# Patient Record
Sex: FEMALE | Race: WHITE | NOT HISPANIC OR LATINO | URBAN - METROPOLITAN AREA
[De-identification: names, ages, dates, MRNs, and addresses within clinical notes are randomized per-mention and may not be internally consistent; named-entity substitution may affect disease eponyms.]

---

## 2018-02-15 ENCOUNTER — OFFICE VISIT (OUTPATIENT)
Dept: OBGYN CLINIC | Facility: CLINIC | Age: 23
End: 2018-02-15
Payer: COMMERCIAL

## 2018-02-15 VITALS
HEIGHT: 61 IN | DIASTOLIC BLOOD PRESSURE: 70 MMHG | SYSTOLIC BLOOD PRESSURE: 110 MMHG | BODY MASS INDEX: 23.03 KG/M2 | WEIGHT: 122 LBS

## 2018-02-15 DIAGNOSIS — Z30.41 ENCOUNTER FOR SURVEILLANCE OF CONTRACEPTIVE PILLS: Primary | ICD-10-CM

## 2018-02-15 PROCEDURE — S0612 ANNUAL GYNECOLOGICAL EXAMINA: HCPCS | Performed by: OBSTETRICS & GYNECOLOGY

## 2018-02-15 RX ORDER — ALBUTEROL SULFATE 90 UG/1
2 AEROSOL, METERED RESPIRATORY (INHALATION) EVERY 6 HOURS
COMMUNITY
Start: 2016-04-20

## 2018-02-15 RX ORDER — NORGESTIMATE AND ETHINYL ESTRADIOL 7DAYSX3 28
1 KIT ORAL DAILY
COMMUNITY
Start: 2016-12-20 | End: 2018-02-15 | Stop reason: SDUPTHER

## 2018-02-15 RX ORDER — NORGESTIMATE AND ETHINYL ESTRADIOL 7DAYSX3 28
1 KIT ORAL DAILY
Qty: 28 TABLET | Refills: 11 | Status: SHIPPED | OUTPATIENT
Start: 2018-02-15 | End: 2019-02-01 | Stop reason: SDUPTHER

## 2018-02-15 NOTE — PROGRESS NOTES
Assessment/Plan:      Pap smear deferred due to low risk status  Encouraged self-breast examination as well as calcium supplementation  Continue Ortho-Tri-Cyclen x1 year  She will return to office in 1 year or p r n  No problem-specific Assessment & Plan notes found for this encounter  Diagnoses and all orders for this visit:    Encounter for surveillance of contraceptive pills  -     norgestimate-ethinyl estradiol (ORTHO TRI-CYCLEN,TRINESSA) 0 18/0 215/0 25 MG-35 MCG per tablet; Take 1 tablet by mouth daily    Other orders  -     albuterol (PROVENTIL HFA,VENTOLIN HFA) 90 mcg/act inhaler; Inhale 2 puffs every 6 (six) hours  -     Discontinue: norgestimate-ethinyl estradiol (ORTHO TRI-CYCLEN,TRINESSA) 0 18/0 215/0 25 MG-35 MCG per tablet; Take 1 tablet by mouth daily          Subjective:      Patient ID: Sarah Murrieta is a 23 y o  female  HPI    This is a 70-year-old female G0 who presents for her annual gyn exam   She offers no complaints today  Her menstrual cycles are regular every 4 weeks lasting 4 days with no breakthrough bleeding  She denies any changes in bowel or bladder function  She is sexually active and has been in a monogamous relationship for 6 years  She did receive the Gardasil vaccine in 2009  All her Pap smears have been normal  Her last Pap smear was 12/2016  The following portions of the patient's history were reviewed and updated as appropriate: allergies, current medications, past family history, past medical history, past social history, past surgical history and problem list     Review of Systems   Constitutional: Negative for fatigue, fever and unexpected weight change  Respiratory: Negative for cough, chest tightness, shortness of breath and wheezing  Cardiovascular: Negative  Negative for chest pain and palpitations  Gastrointestinal: Negative    Negative for abdominal distention, abdominal pain, blood in stool, constipation, diarrhea, nausea and vomiting  Genitourinary: Negative  Negative for difficulty urinating, dyspareunia, dysuria, flank pain, frequency, genital sores, hematuria, pelvic pain, urgency, vaginal bleeding, vaginal discharge and vaginal pain  Skin: Negative for rash  Objective:    Vitals:    02/15/18 1400   BP: 110/70        Physical Exam   Constitutional: She appears well-developed and well-nourished  Cardiovascular: Normal rate and regular rhythm  Pulmonary/Chest: Effort normal and breath sounds normal  Right breast exhibits no inverted nipple, no mass, no nipple discharge, no skin change and no tenderness  Left breast exhibits no inverted nipple, no mass, no nipple discharge, no skin change and no tenderness  Abdominal: Soft  Bowel sounds are normal  She exhibits no distension  There is no tenderness  There is no guarding  Genitourinary: Vagina normal and uterus normal  Cervix exhibits no motion tenderness, no discharge and no friability  Right adnexum displays no mass, no tenderness and no fullness  Left adnexum displays no mass, no tenderness and no fullness  No vaginal discharge found

## 2019-02-01 DIAGNOSIS — Z30.41 ENCOUNTER FOR SURVEILLANCE OF CONTRACEPTIVE PILLS: ICD-10-CM

## 2019-02-03 RX ORDER — NORGESTIMATE AND ETHINYL ESTRADIOL 7DAYSX3 28
KIT ORAL
Qty: 28 TABLET | Refills: 0 | Status: SHIPPED | OUTPATIENT
Start: 2019-02-03 | End: 2019-02-19 | Stop reason: SDUPTHER

## 2019-02-19 ENCOUNTER — ANNUAL EXAM (OUTPATIENT)
Dept: OBGYN CLINIC | Facility: CLINIC | Age: 24
End: 2019-02-19
Payer: COMMERCIAL

## 2019-02-19 VITALS
SYSTOLIC BLOOD PRESSURE: 120 MMHG | DIASTOLIC BLOOD PRESSURE: 80 MMHG | BODY MASS INDEX: 24.55 KG/M2 | HEIGHT: 61 IN | WEIGHT: 130 LBS

## 2019-02-19 DIAGNOSIS — Z30.41 ENCOUNTER FOR SURVEILLANCE OF CONTRACEPTIVE PILLS: ICD-10-CM

## 2019-02-19 DIAGNOSIS — Z11.3 SCREEN FOR STD (SEXUALLY TRANSMITTED DISEASE): ICD-10-CM

## 2019-02-19 DIAGNOSIS — Z01.419 ENCOUNTER FOR ANNUAL ROUTINE GYNECOLOGICAL EXAMINATION: Primary | ICD-10-CM

## 2019-02-19 PROCEDURE — G0145 SCR C/V CYTO,THINLAYER,RESCR: HCPCS | Performed by: PATHOLOGY

## 2019-02-19 PROCEDURE — S0612 ANNUAL GYNECOLOGICAL EXAMINA: HCPCS | Performed by: OBSTETRICS & GYNECOLOGY

## 2019-02-19 PROCEDURE — 87491 CHLMYD TRACH DNA AMP PROBE: CPT | Performed by: OBSTETRICS & GYNECOLOGY

## 2019-02-19 PROCEDURE — G0124 SCREEN C/V THIN LAYER BY MD: HCPCS | Performed by: PATHOLOGY

## 2019-02-19 PROCEDURE — 87624 HPV HI-RISK TYP POOLED RSLT: CPT | Performed by: OBSTETRICS & GYNECOLOGY

## 2019-02-19 PROCEDURE — 87591 N.GONORRHOEAE DNA AMP PROB: CPT | Performed by: OBSTETRICS & GYNECOLOGY

## 2019-02-19 RX ORDER — NORGESTIMATE AND ETHINYL ESTRADIOL 7DAYSX3 28
1 KIT ORAL DAILY
Qty: 84 TABLET | Refills: 3 | Status: SHIPPED | OUTPATIENT
Start: 2019-02-19 | End: 2020-02-02

## 2019-02-19 NOTE — PROGRESS NOTES
Assessment/Plan:    Pap smear done as well as annual   Cultures were obtained for GC and Chlamydia from thin prep  Encouraged self-breast examination as well as calcium supplementation  Continue Ortho-Tri-Cyclen x1 year  Reviewed safe sex practices  Return to office in 1 year or p r n  No problem-specific Assessment & Plan notes found for this encounter  Diagnoses and all orders for this visit:    Encounter for annual routine gynecological examination  -     Liquid-based pap, screening    Encounter for surveillance of contraceptive pills  -     norgestimate-ethinyl estradiol (ORTHO TRI-CYCLEN,TRINESSA) 0 18/0 215/0 25 MG-35 MCG per tablet; Take 1 tablet by mouth daily    Screen for STD (sexually transmitted disease)  -     Chlamydia/GC amplified DNA by PCR          Subjective:      Patient ID: Kennedy Sky is a 20 y o  female  HPI     This is a 18-year-old female G0 presents for annual gyn exam   She offers no complaints today  Her menstrual cycles are regular every 4 weeks lasting 4 days with no breakthrough bleeding  She denies any changes in bowel or bladder function  She is sexually active and has been in a new relationship for the last 6 months  She does not use condoms regularly  She did receive the Gardasil vaccine in 2009  Her Pap smears have been normal     The following portions of the patient's history were reviewed and updated as appropriate: allergies, current medications, past family history, past medical history, past social history, past surgical history and problem list     Review of Systems   Constitutional: Negative for fatigue, fever and unexpected weight change  Respiratory: Negative for cough, chest tightness, shortness of breath and wheezing  Cardiovascular: Negative  Negative for chest pain and palpitations  Gastrointestinal: Negative  Negative for abdominal distention, abdominal pain, blood in stool, constipation, diarrhea, nausea and vomiting  Genitourinary: Negative  Negative for difficulty urinating, dyspareunia, dysuria, flank pain, frequency, genital sores, hematuria, pelvic pain, urgency, vaginal bleeding, vaginal discharge and vaginal pain  Skin: Negative for rash  Objective:      /80   Ht 5' 1" (1 549 m)   Wt 59 kg (130 lb)   LMP 02/14/2019 (Exact Date)   BMI 24 56 kg/m²          Physical Exam   Constitutional: She appears well-developed and well-nourished  Cardiovascular: Normal rate and regular rhythm  Pulmonary/Chest: Effort normal and breath sounds normal  Right breast exhibits no inverted nipple, no mass, no nipple discharge, no skin change and no tenderness  Left breast exhibits no inverted nipple, no mass, no nipple discharge, no skin change and no tenderness  Abdominal: Soft  Bowel sounds are normal  She exhibits no distension  There is no tenderness  There is no rebound and no guarding  Genitourinary: Vagina normal and uterus normal  There is no lesion on the right labia  There is no lesion on the left labia  Cervix exhibits no discharge and no friability  Right adnexum displays no mass, no tenderness and no fullness  Left adnexum displays no mass, no tenderness and no fullness  No vaginal discharge found

## 2019-02-21 LAB
C TRACH DNA SPEC QL NAA+PROBE: NEGATIVE
N GONORRHOEA DNA SPEC QL NAA+PROBE: NEGATIVE

## 2019-02-22 LAB
LAB AP GYN PRIMARY INTERPRETATION: ABNORMAL
LAB AP LMP: ABNORMAL
Lab: ABNORMAL
PATH INTERP SPEC-IMP: ABNORMAL

## 2019-02-25 LAB
HPV HR 12 DNA CVX QL NAA+PROBE: NEGATIVE
HPV16 DNA CVX QL NAA+PROBE: NEGATIVE
HPV18 DNA CVX QL NAA+PROBE: NEGATIVE

## 2019-02-26 ENCOUNTER — TELEPHONE (OUTPATIENT)
Dept: OBGYN CLINIC | Facility: CLINIC | Age: 24
End: 2019-02-26

## 2019-02-26 NOTE — TELEPHONE ENCOUNTER
----- Message from Elisha Walton DO sent at 2/24/2019 12:57 PM EST -----  Please call lab, pap= ASCUS, is HPV available

## 2019-03-02 DIAGNOSIS — Z30.41 ENCOUNTER FOR SURVEILLANCE OF CONTRACEPTIVE PILLS: ICD-10-CM

## 2019-03-03 RX ORDER — NORGESTIMATE AND ETHINYL ESTRADIOL 7DAYSX3 28
KIT ORAL
Qty: 28 TABLET | Refills: 0 | Status: SHIPPED | OUTPATIENT
Start: 2019-03-03 | End: 2022-02-14 | Stop reason: SDUPTHER

## 2020-01-31 DIAGNOSIS — Z30.41 ENCOUNTER FOR SURVEILLANCE OF CONTRACEPTIVE PILLS: ICD-10-CM

## 2020-02-02 RX ORDER — NORGESTIMATE AND ETHINYL ESTRADIOL 7DAYSX3 28
KIT ORAL
Qty: 84 TABLET | Refills: 0 | Status: SHIPPED | OUTPATIENT
Start: 2020-02-02 | End: 2020-02-17 | Stop reason: SDUPTHER

## 2020-02-17 ENCOUNTER — ANNUAL EXAM (OUTPATIENT)
Dept: OBGYN CLINIC | Facility: CLINIC | Age: 25
End: 2020-02-17
Payer: COMMERCIAL

## 2020-02-17 VITALS
HEIGHT: 61 IN | DIASTOLIC BLOOD PRESSURE: 74 MMHG | WEIGHT: 126.8 LBS | BODY MASS INDEX: 23.94 KG/M2 | SYSTOLIC BLOOD PRESSURE: 112 MMHG

## 2020-02-17 DIAGNOSIS — Z30.41 ENCOUNTER FOR SURVEILLANCE OF CONTRACEPTIVE PILLS: ICD-10-CM

## 2020-02-17 DIAGNOSIS — Z01.419 ENCOUNTER FOR ANNUAL ROUTINE GYNECOLOGICAL EXAMINATION: Primary | ICD-10-CM

## 2020-02-17 PROCEDURE — 99395 PREV VISIT EST AGE 18-39: CPT | Performed by: OBSTETRICS & GYNECOLOGY

## 2020-02-17 RX ORDER — NORGESTIMATE AND ETHINYL ESTRADIOL 7DAYSX3 28
1 KIT ORAL DAILY
Qty: 84 TABLET | Refills: 4 | Status: SHIPPED | OUTPATIENT
Start: 2020-02-17 | End: 2021-02-09 | Stop reason: SDUPTHER

## 2020-02-17 NOTE — PROGRESS NOTES
Assessment/Plan:   Pap smear done as well as annual   Encouraged self-breast examination as well as calcium supplementation  Continue Ortho-Tri-Cyclen x1 year  Return to office in 1 year or p r n  No problem-specific Assessment & Plan notes found for this encounter  Diagnoses and all orders for this visit:    Encounter for annual routine gynecological examination    Encounter for surveillance of contraceptive pills  -     norgestimate-ethinyl estradiol (Tri Femynor) 0 18/0 215/0 25 MG-35 MCG per tablet; Take 1 tablet by mouth daily          Subjective:      Patient ID: Corie Maldonado is a 30 y o  female  HPI     This is a 49-year-old female G0 who presents for annual gyn exam   She offers no complaints today  Her menstrual cycles are regular every 4 weeks lasting 4 days with no breakthrough bleeding  She is sexually active and has been in a monogamous relationship for 2 years  Her Pap smear last year was ASCUS negative HPV  She did receive the Gardasil vaccine in 2009  The following portions of the patient's history were reviewed and updated as appropriate: allergies, current medications, past family history, past medical history, past social history, past surgical history and problem list     Review of Systems   Constitutional: Negative for fatigue, fever and unexpected weight change  Respiratory: Negative for cough, chest tightness, shortness of breath and wheezing  Cardiovascular: Negative  Negative for chest pain and palpitations  Gastrointestinal: Negative  Negative for abdominal distention, abdominal pain, blood in stool, constipation, diarrhea, nausea and vomiting  Genitourinary: Negative  Negative for difficulty urinating, dyspareunia, dysuria, flank pain, frequency, genital sores, hematuria, pelvic pain, urgency, vaginal bleeding, vaginal discharge and vaginal pain  Skin: Negative for rash           Objective:      /74   Ht 5' 1" (1 549 m)   Wt 57 5 kg (126 lb 12 8 oz)   LMP 02/13/2020 (Exact Date)   Breastfeeding No   BMI 23 96 kg/m²          Physical Exam   Constitutional: She appears well-developed and well-nourished  Cardiovascular: Normal rate and regular rhythm  Pulmonary/Chest: Effort normal and breath sounds normal  Right breast exhibits no inverted nipple, no mass, no nipple discharge, no skin change and no tenderness  Left breast exhibits no inverted nipple, no mass, no nipple discharge, no skin change and no tenderness  Abdominal: Soft  Bowel sounds are normal  She exhibits no distension  There is no tenderness  There is no rebound and no guarding  Genitourinary: Vagina normal and uterus normal  There is no lesion on the right labia  There is no lesion on the left labia  Cervix exhibits no discharge and no friability  Right adnexum displays no mass, no tenderness and no fullness  Left adnexum displays no mass, no tenderness and no fullness  No vaginal discharge found

## 2020-02-19 LAB
CLINICAL INFO: NORMAL
CYTO CVX: NORMAL
CYTOLOGY CMNT CVX/VAG CYTO-IMP: NORMAL
DATE PREVIOUS BX: NORMAL
LMP START DATE: NORMAL
SL AMB PREV. PAP:: NORMAL
SPECIMEN SOURCE CVX/VAG CYTO: NORMAL

## 2020-03-22 DIAGNOSIS — Z30.41 ENCOUNTER FOR SURVEILLANCE OF CONTRACEPTIVE PILLS: ICD-10-CM

## 2020-03-23 RX ORDER — NORGESTIMATE AND ETHINYL ESTRADIOL 7DAYSX3 28
KIT ORAL
Qty: 84 TABLET | Refills: 0 | Status: SHIPPED | OUTPATIENT
Start: 2020-03-23 | End: 2020-06-10

## 2020-06-10 DIAGNOSIS — Z30.41 ENCOUNTER FOR SURVEILLANCE OF CONTRACEPTIVE PILLS: ICD-10-CM

## 2020-06-10 RX ORDER — NORGESTIMATE AND ETHINYL ESTRADIOL 7DAYSX3 28
KIT ORAL
Qty: 84 TABLET | Refills: 0 | Status: SHIPPED | OUTPATIENT
Start: 2020-06-10 | End: 2022-02-14 | Stop reason: SDUPTHER

## 2021-02-09 ENCOUNTER — ANNUAL EXAM (OUTPATIENT)
Dept: OBGYN CLINIC | Facility: CLINIC | Age: 26
End: 2021-02-09
Payer: COMMERCIAL

## 2021-02-09 VITALS
SYSTOLIC BLOOD PRESSURE: 112 MMHG | HEIGHT: 61 IN | WEIGHT: 118 LBS | BODY MASS INDEX: 22.28 KG/M2 | DIASTOLIC BLOOD PRESSURE: 74 MMHG

## 2021-02-09 DIAGNOSIS — Z87.42 HISTORY OF ABNORMAL CERVICAL PAPANICOLAOU SMEAR: ICD-10-CM

## 2021-02-09 DIAGNOSIS — Z01.419 ENCOUNTER FOR ANNUAL ROUTINE GYNECOLOGICAL EXAMINATION: Primary | ICD-10-CM

## 2021-02-09 DIAGNOSIS — Z30.41 ENCOUNTER FOR SURVEILLANCE OF CONTRACEPTIVE PILLS: ICD-10-CM

## 2021-02-09 PROCEDURE — G0145 SCR C/V CYTO,THINLAYER,RESCR: HCPCS | Performed by: OBSTETRICS & GYNECOLOGY

## 2021-02-09 PROCEDURE — 99395 PREV VISIT EST AGE 18-39: CPT | Performed by: OBSTETRICS & GYNECOLOGY

## 2021-02-09 RX ORDER — NORGESTIMATE AND ETHINYL ESTRADIOL 7DAYSX3 28
1 KIT ORAL DAILY
Qty: 84 TABLET | Refills: 4 | Status: SHIPPED | OUTPATIENT
Start: 2021-02-09 | End: 2022-02-14 | Stop reason: SDUPTHER

## 2021-02-09 NOTE — PROGRESS NOTES
Assessment/Plan:    Pap smear done as well as annual   Encouraged self-breast examination as well as calcium supplementation  Continue Ortho-Tri-Cyclen x1 year  Return to office in 1 year or p r n  No problem-specific Assessment & Plan notes found for this encounter  Diagnoses and all orders for this visit:    Encounter for annual routine gynecological examination  -     Liquid-based pap, screening    History of abnormal cervical Papanicolaou smear    Encounter for surveillance of contraceptive pills  -     norgestimate-ethinyl estradiol (Tri Femynor) 0 18/0 215/0 25 MG-35 MCG per tablet; Take 1 tablet by mouth daily          Subjective:      Patient ID: Luellen Lennox is a 30 y o  female  HPI      this is a very pleasant 38-year-old female G0 who presents for annual gyn exam   She offers no complaints today  Her menstrual cycles are regular every 28 days lasting 4 days with no breakthrough bleeding  She denies any changes in bowel or bladder function  She is sexually active and has been in a monogamous relationship for over 2 years  She does use condoms regularly  She did receive the Gardasil vaccine in 2009  She did have an abnormal Pap smear ( 2019)revealing ASCUS negative HPV  The following portions of the patient's history were reviewed and updated as appropriate: allergies, current medications, past family history, past medical history, past social history, past surgical history and problem list     Review of Systems   Constitutional: Negative for fatigue, fever and unexpected weight change  Respiratory: Negative for cough, chest tightness, shortness of breath and wheezing  Cardiovascular: Negative  Negative for chest pain and palpitations  Gastrointestinal: Negative  Negative for abdominal distention, abdominal pain, blood in stool, constipation, diarrhea, nausea and vomiting  Genitourinary: Negative    Negative for difficulty urinating, dyspareunia, dysuria, flank pain, frequency, genital sores, hematuria, pelvic pain, urgency, vaginal bleeding, vaginal discharge and vaginal pain  Skin: Negative for rash  Objective:      /74   Ht 5' 1" (1 549 m)   Wt 53 5 kg (118 lb)   LMP 01/14/2021   BMI 22 30 kg/m²          Physical Exam  Constitutional:       Appearance: Normal appearance  She is well-developed  Cardiovascular:      Rate and Rhythm: Normal rate and regular rhythm  Pulmonary:      Effort: Pulmonary effort is normal       Breath sounds: Normal breath sounds  Chest:      Breasts:         Right: No inverted nipple, mass, nipple discharge, skin change or tenderness  Left: No inverted nipple, mass, nipple discharge, skin change or tenderness  Abdominal:      General: Bowel sounds are normal  There is no distension  Palpations: Abdomen is soft  Tenderness: There is no abdominal tenderness  There is no guarding or rebound  Genitourinary:     Labia:         Right: No rash, tenderness or lesion  Left: No rash, tenderness or lesion  Vagina: Normal  No signs of injury  No vaginal discharge, tenderness or lesions  Cervix: No cervical motion tenderness, discharge, friability, lesion, erythema or cervical bleeding  Uterus: Not enlarged and not tender  Adnexa:         Right: No mass, tenderness or fullness  Left: No mass, tenderness or fullness  Skin:     General: Skin is warm and dry  Neurological:      Mental Status: She is alert and oriented to person, place, and time

## 2021-02-15 LAB
LAB AP GYN PRIMARY INTERPRETATION: NORMAL
LAB AP LMP: NORMAL
Lab: NORMAL

## 2021-06-03 ENCOUNTER — TELEPHONE (OUTPATIENT)
Dept: DERMATOLOGY | Facility: CLINIC | Age: 26
End: 2021-06-03

## 2021-06-03 NOTE — TELEPHONE ENCOUNTER
Pt calling to schedule NP apt for acne, informed NP waiting list and to cb in 3-4 wks for possible openings

## 2022-02-14 ENCOUNTER — ANNUAL EXAM (OUTPATIENT)
Dept: OBGYN CLINIC | Facility: CLINIC | Age: 27
End: 2022-02-14
Payer: COMMERCIAL

## 2022-02-14 VITALS
BODY MASS INDEX: 20.77 KG/M2 | SYSTOLIC BLOOD PRESSURE: 114 MMHG | HEIGHT: 61 IN | WEIGHT: 110 LBS | DIASTOLIC BLOOD PRESSURE: 78 MMHG

## 2022-02-14 DIAGNOSIS — Z30.41 ENCOUNTER FOR SURVEILLANCE OF CONTRACEPTIVE PILLS: ICD-10-CM

## 2022-02-14 DIAGNOSIS — Z01.419 ENCOUNTER FOR ANNUAL ROUTINE GYNECOLOGICAL EXAMINATION: Primary | ICD-10-CM

## 2022-02-14 PROCEDURE — 99395 PREV VISIT EST AGE 18-39: CPT | Performed by: OBSTETRICS & GYNECOLOGY

## 2022-02-14 RX ORDER — NORGESTIMATE AND ETHINYL ESTRADIOL 7DAYSX3 28
1 KIT ORAL DAILY
Qty: 84 TABLET | Refills: 4 | Status: SHIPPED | OUTPATIENT
Start: 2022-02-14

## 2022-02-14 RX ORDER — FAMOTIDINE 20 MG/1
TABLET, FILM COATED ORAL
COMMUNITY
Start: 2021-11-28

## 2022-02-14 NOTE — PROGRESS NOTES
Assessment/Plan:  Pap smear deferred due to low risk status  Encouraged self-breast examination as well as calcium supplementation  Continue Ortho-Tri-Cyclen x1 year  Return to office in 1 year or p r n  No problem-specific Assessment & Plan notes found for this encounter  Diagnoses and all orders for this visit:    Encounter for annual routine gynecological examination    Encounter for surveillance of contraceptive pills  -     norgestimate-ethinyl estradiol (Tri Femynor) 0 18/0 215/0 25 MG-35 MCG per tablet; Take 1 tablet by mouth daily    Other orders  -     famotidine (PEPCID) 20 mg tablet; TAKE 1 TABLET BY MOUTH 2 TIMES A DAY AS NEEDED FOR HEARTBURN  Subjective:      Patient ID: Leonor Gonzalez is a 27 y o  female  HPI     This is a pleasant 10-year-old female G0 who presents for her gyn exam   She states her menstrual cycles are every 28 days lasting 4 days with no breakthrough bleeding  She denies any changes in bowel or bladder function  She is sexually active and has been in a monogamous relationship for over 3 years  She did receive the Gardasil vaccine in 2009  Her last several Pap smears have been normal       The following portions of the patient's history were reviewed and updated as appropriate: allergies, current medications, past family history, past medical history, past social history, past surgical history and problem list     Review of Systems   Constitutional: Negative for fatigue, fever and unexpected weight change  Respiratory: Negative for cough, chest tightness, shortness of breath and wheezing  Cardiovascular: Negative  Negative for chest pain and palpitations  Gastrointestinal: Negative  Negative for abdominal distention, abdominal pain, blood in stool, constipation, diarrhea, nausea and vomiting  Genitourinary: Negative    Negative for difficulty urinating, dyspareunia, dysuria, flank pain, frequency, genital sores, hematuria, pelvic pain, urgency, vaginal bleeding, vaginal discharge and vaginal pain  Skin: Negative for rash  Objective:      /78   Ht 5' 1" (1 549 m)   Wt 49 9 kg (110 lb)   LMP 02/10/2022   BMI 20 78 kg/m²          Physical Exam  Constitutional:       Appearance: Normal appearance  She is well-developed  Cardiovascular:      Rate and Rhythm: Normal rate and regular rhythm  Pulmonary:      Effort: Pulmonary effort is normal       Breath sounds: Normal breath sounds  Chest:   Breasts:      Right: No inverted nipple, mass, nipple discharge, skin change or tenderness  Left: No inverted nipple, mass, nipple discharge, skin change or tenderness  Abdominal:      General: Bowel sounds are normal  There is no distension  Palpations: Abdomen is soft  Tenderness: There is no abdominal tenderness  There is no guarding or rebound  Genitourinary:     Labia:         Right: No rash, tenderness or lesion  Left: No rash, tenderness or lesion  Vagina: Normal  No signs of injury  No vaginal discharge or tenderness  Cervix: No cervical motion tenderness, discharge, friability, lesion, erythema or cervical bleeding  Uterus: Not enlarged and not tender  Adnexa:         Right: No mass, tenderness or fullness  Left: No mass, tenderness or fullness  Neurological:      Mental Status: She is alert and oriented to person, place, and time     Psychiatric:         Behavior: Behavior normal

## 2023-02-13 ENCOUNTER — ANNUAL EXAM (OUTPATIENT)
Dept: OBGYN CLINIC | Facility: CLINIC | Age: 28
End: 2023-02-13

## 2023-02-13 VITALS
HEIGHT: 61 IN | DIASTOLIC BLOOD PRESSURE: 80 MMHG | BODY MASS INDEX: 22.39 KG/M2 | WEIGHT: 118.6 LBS | SYSTOLIC BLOOD PRESSURE: 122 MMHG

## 2023-02-13 DIAGNOSIS — Z30.41 ENCOUNTER FOR SURVEILLANCE OF CONTRACEPTIVE PILLS: ICD-10-CM

## 2023-02-13 DIAGNOSIS — Z01.419 ENCOUNTER FOR ANNUAL ROUTINE GYNECOLOGICAL EXAMINATION: Primary | ICD-10-CM

## 2023-02-13 RX ORDER — NORGESTIMATE AND ETHINYL ESTRADIOL 7DAYSX3 28
1 KIT ORAL DAILY
Qty: 84 TABLET | Refills: 4 | Status: SHIPPED | OUTPATIENT
Start: 2023-02-13

## 2023-02-13 NOTE — PROGRESS NOTES
Assessment/Plan:  Pap smear done as well as annual   Encouraged self breast examination as well as calcium supplementation  Continue Ortho Tri-Cyclen x1 year  Return to office in 1 year or as needed  No problem-specific Assessment & Plan notes found for this encounter  Diagnoses and all orders for this visit:    Encounter for annual routine gynecological examination    Encounter for surveillance of contraceptive pills  -     norgestimate-ethinyl estradiol (Tri Femynor) 0 18/0 215/0 25 MG-35 MCG per tablet; Take 1 tablet by mouth daily          Subjective:      Patient ID: Corazon Agarwal is a 32 y o  female  HPI     This is a pleasant 64-year-old female G0 who presents for GYN exam   She offers no complaints today  Her menstrual cycles are regular every 4 weeks lasting 4 days with no breakthrough bleeding  She denies any changes in bowel or bladder function  She is sexually active and has been in a monogamous relationship for over 4 years  Pap smears have been normal   She did receive the Gardasil vaccine in 2009  The following portions of the patient's history were reviewed and updated as appropriate: allergies, current medications, past family history, past medical history, past social history, past surgical history and problem list     Review of Systems   Constitutional: Negative for fatigue, fever and unexpected weight change  Respiratory: Negative for cough, chest tightness, shortness of breath and wheezing  Cardiovascular: Negative  Negative for chest pain and palpitations  Gastrointestinal: Negative  Negative for abdominal distention, abdominal pain, blood in stool, constipation, diarrhea, nausea and vomiting  Genitourinary: Negative  Negative for difficulty urinating, dyspareunia, dysuria, flank pain, frequency, genital sores, hematuria, pelvic pain, urgency, vaginal bleeding, vaginal discharge and vaginal pain  Skin: Negative for rash           Objective:      /80  5' 1" (1 549 m)   Wt 53 8 kg (118 lb 9 6 oz)   LMP 02/09/2023 (Exact Date)   BMI 22 41 kg/m²          Physical Exam  Constitutional:       Appearance: Normal appearance  She is well-developed  Cardiovascular:      Rate and Rhythm: Normal rate and regular rhythm  Pulmonary:      Effort: Pulmonary effort is normal       Breath sounds: Normal breath sounds  Chest:   Breasts:     Right: No inverted nipple, mass, nipple discharge, skin change or tenderness  Left: No inverted nipple, mass, nipple discharge, skin change or tenderness  Abdominal:      General: Bowel sounds are normal  There is no distension  Palpations: Abdomen is soft  Tenderness: There is no abdominal tenderness  There is no guarding or rebound  Genitourinary:     Labia:         Right: No rash, tenderness or lesion  Left: No rash, tenderness or lesion  Vagina: Normal  No signs of injury  No vaginal discharge or tenderness  Cervix: No cervical motion tenderness, discharge, friability, lesion, erythema or cervical bleeding  Uterus: Not enlarged and not tender  Adnexa:         Right: No mass, tenderness or fullness  Left: No mass, tenderness or fullness  Neurological:      Mental Status: She is alert

## 2023-02-17 LAB
LAB AP GYN PRIMARY INTERPRETATION: NORMAL
LAB AP LMP: NORMAL
Lab: NORMAL

## 2024-02-15 ENCOUNTER — ANNUAL EXAM (OUTPATIENT)
Dept: OBGYN CLINIC | Facility: CLINIC | Age: 29
End: 2024-02-15
Payer: COMMERCIAL

## 2024-02-15 VITALS
DIASTOLIC BLOOD PRESSURE: 68 MMHG | SYSTOLIC BLOOD PRESSURE: 110 MMHG | WEIGHT: 123.8 LBS | BODY MASS INDEX: 23.37 KG/M2 | HEIGHT: 61 IN

## 2024-02-15 DIAGNOSIS — Z30.41 ENCOUNTER FOR SURVEILLANCE OF CONTRACEPTIVE PILLS: ICD-10-CM

## 2024-02-15 DIAGNOSIS — Z01.419 ENCOUNTER FOR ANNUAL ROUTINE GYNECOLOGICAL EXAMINATION: Primary | ICD-10-CM

## 2024-02-15 PROCEDURE — 99395 PREV VISIT EST AGE 18-39: CPT | Performed by: OBSTETRICS & GYNECOLOGY

## 2024-02-15 RX ORDER — NORGESTIMATE AND ETHINYL ESTRADIOL 7DAYSX3 28
1 KIT ORAL DAILY
Qty: 84 TABLET | Refills: 4 | Status: SHIPPED | OUTPATIENT
Start: 2024-02-15

## 2024-02-15 NOTE — PROGRESS NOTES
Assessment/Plan:  Pap smear deferred due to low risk status.  Encouraged self breast examination as well as calcium supplementation.  Continue Ortho Tri-Cyclen x 1 year.  Reviewed menstrual diary.  All questions answered.  She will return to office in 1 year or as needed  No problem-specific Assessment & Plan notes found for this encounter.       Diagnoses and all orders for this visit:    Encounter for annual routine gynecological examination    Encounter for surveillance of contraceptive pills  -     norgestimate-ethinyl estradiol (Tri Femynor) 0.18/0.215/0.25 MG-35 MCG per tablet; Take 1 tablet by mouth daily          Subjective:      Patient ID: Floresita Ladd is a 28 y.o. female.    HPI    This is a pleasant 28-year-old female G0 who presents for GYN exam.  She states her menstrual cycles are every 28 days lasting 3 days with no breakthrough bleeding.  She states her cycles have been lighter over the last few months.  She has been very compliant with her birth control pill.  She denies any changes in bowel or bladder function.  Patient has not been sexually active  Since 12/2023.  Relationship ended after 5 years.  She did receive the Gardasil vaccine in 2009.  Pap smears have been normal, last Pap 2023.    Pap 2/2023 wnl    The following portions of the patient's history were reviewed and updated as appropriate: allergies, current medications, past family history, past medical history, past social history, past surgical history, and problem list.    Review of Systems   Constitutional:  Negative for fatigue, fever and unexpected weight change.   Respiratory:  Negative for cough, chest tightness, shortness of breath and wheezing.    Cardiovascular: Negative.  Negative for chest pain and palpitations.   Gastrointestinal: Negative.  Negative for abdominal distention, abdominal pain, blood in stool, constipation, diarrhea, nausea and vomiting.   Genitourinary: Negative.  Negative for difficulty urinating,  "dyspareunia, dysuria, flank pain, frequency, genital sores, hematuria, pelvic pain, urgency, vaginal bleeding, vaginal discharge and vaginal pain.   Skin:  Negative for rash.         Objective:      /68   Ht 5' 1\" (1.549 m)   Wt 56.2 kg (123 lb 12.8 oz)   LMP 02/08/2024 (Exact Date)   BMI 23.39 kg/m²          Physical Exam  Constitutional:       Appearance: Normal appearance. She is well-developed.   HENT:      Head: Normocephalic and atraumatic.   Cardiovascular:      Rate and Rhythm: Normal rate and regular rhythm.   Pulmonary:      Effort: Pulmonary effort is normal.      Breath sounds: Normal breath sounds.   Chest:   Breasts:     Right: No inverted nipple, mass, nipple discharge, skin change or tenderness.      Left: No inverted nipple, mass, nipple discharge, skin change or tenderness.   Abdominal:      General: Bowel sounds are normal. There is no distension.      Palpations: Abdomen is soft.      Tenderness: There is no abdominal tenderness. There is no guarding or rebound.   Genitourinary:     Labia:         Right: No rash, tenderness or lesion.         Left: No rash, tenderness or lesion.       Vagina: Normal. No signs of injury. No vaginal discharge or tenderness.      Cervix: No cervical motion tenderness, discharge, friability, lesion or cervical bleeding.      Uterus: Not enlarged and not tender.       Adnexa:         Right: No mass, tenderness or fullness.          Left: No mass, tenderness or fullness.     Neurological:      Mental Status: She is alert.   Psychiatric:         Behavior: Behavior normal.           "

## 2024-03-31 DIAGNOSIS — Z30.41 ENCOUNTER FOR SURVEILLANCE OF CONTRACEPTIVE PILLS: ICD-10-CM

## 2024-04-02 RX ORDER — NORGESTIMATE AND ETHINYL ESTRADIOL 7DAYSX3 28
1 KIT ORAL DAILY
Qty: 84 TABLET | Refills: 1 | Status: SHIPPED | OUTPATIENT
Start: 2024-04-02 | End: 2024-04-03 | Stop reason: SDUPTHER

## 2024-04-03 RX ORDER — NORGESTIMATE AND ETHINYL ESTRADIOL 7DAYSX3 28
1 KIT ORAL DAILY
Qty: 84 TABLET | Refills: 1 | Status: SHIPPED | OUTPATIENT
Start: 2024-04-03

## 2024-04-03 NOTE — TELEPHONE ENCOUNTER
Patient called back and she goes to [facility] on [facility]. Thank you

## 2024-04-12 ENCOUNTER — NURSE TRIAGE (OUTPATIENT)
Dept: OTHER | Facility: OTHER | Age: 29
End: 2024-04-12

## 2024-04-12 NOTE — TELEPHONE ENCOUNTER
"Regarding: UTI  ----- Message from Edyta Garcia sent at 4/12/2024  5:14 AM EDT -----  \"I think that I may have a UTI. Its starting to burn and I have to pee constantly.\"    "

## 2024-04-12 NOTE — TELEPHONE ENCOUNTER
Patient called back.  She states she still has urinary discomfort and frequency when going to the bathroom.  Patient offered an office appointment but defers for now. She states she would like to try AZO.  Advised patient to increase her fluids, adding lemon to her water.  Advised to call back if symptoms do not improve or to report to an urgent care to be further evaluated.  She verbalized understanding and voiced appreciation.

## 2024-04-12 NOTE — TELEPHONE ENCOUNTER
"Reason for Disposition  • Urinating more frequently than usual (i.e., frequency)    Answer Assessment - Initial Assessment Questions  1. SYMPTOM: \"What's the main symptom you're concerned about?\" (e.g., frequency, incontinence)      Burning with urination, urgency and frequency, voiding small amount.  2. ONSET: \"When did the  urinary burning  start?\"      Late last night before 12 midnight.  3. PAIN: \"Is there any pain?\" If Yes, ask: \"How bad is it?\" (Scale: 1-10; mild, moderate, severe)      Slight lower pelvic discomfort  4. CAUSE: \"What do you think is causing the symptoms?\"      UTI  5. OTHER SYMPTOMS: \"Do you have any other symptoms?\" (e.g., fever, flank pain, blood in urine, pain with urination)      Denies  6. PREGNANCY: \"Is there any chance you are pregnant?\" \"When was your last menstrual period?\"      No    Protocols used: Urinary Symptoms-ADULT-AH    "

## 2025-02-18 ENCOUNTER — ANNUAL EXAM (OUTPATIENT)
Dept: OBGYN CLINIC | Facility: CLINIC | Age: 30
End: 2025-02-18
Payer: COMMERCIAL

## 2025-02-18 VITALS
DIASTOLIC BLOOD PRESSURE: 68 MMHG | HEIGHT: 61 IN | SYSTOLIC BLOOD PRESSURE: 110 MMHG | WEIGHT: 130.4 LBS | BODY MASS INDEX: 24.62 KG/M2

## 2025-02-18 DIAGNOSIS — Z01.419 ENCOUNTER FOR ANNUAL ROUTINE GYNECOLOGICAL EXAMINATION: Primary | ICD-10-CM

## 2025-02-18 DIAGNOSIS — Z11.3 SCREEN FOR STD (SEXUALLY TRANSMITTED DISEASE): ICD-10-CM

## 2025-02-18 DIAGNOSIS — Z30.41 ENCOUNTER FOR SURVEILLANCE OF CONTRACEPTIVE PILLS: ICD-10-CM

## 2025-02-18 PROCEDURE — 87661 TRICHOMONAS VAGINALIS AMPLIF: CPT | Performed by: OBSTETRICS & GYNECOLOGY

## 2025-02-18 PROCEDURE — 87491 CHLMYD TRACH DNA AMP PROBE: CPT | Performed by: OBSTETRICS & GYNECOLOGY

## 2025-02-18 PROCEDURE — 87591 N.GONORRHOEAE DNA AMP PROB: CPT | Performed by: OBSTETRICS & GYNECOLOGY

## 2025-02-18 PROCEDURE — 99395 PREV VISIT EST AGE 18-39: CPT | Performed by: OBSTETRICS & GYNECOLOGY

## 2025-02-18 PROCEDURE — 87563 M. GENITALIUM AMP PROBE: CPT | Performed by: OBSTETRICS & GYNECOLOGY

## 2025-02-18 RX ORDER — NORGESTIMATE AND ETHINYL ESTRADIOL 7DAYSX3 28
1 KIT ORAL DAILY
Qty: 84 TABLET | Refills: 3 | Status: SHIPPED | OUTPATIENT
Start: 2025-02-18

## 2025-02-18 NOTE — PROGRESS NOTES
Name: Floresita Ladd      : 1995      MRN: [id number]  Encounter Provider: Tamar Infante DO  Encounter Date: 2025   Encounter department: [facility]  :  Assessment & Plan  Encounter for annual routine gynecological examination         Encounter for surveillance of contraceptive pills    Orders:    norgestimate-ethinyl estradiol (ORTHO TRI-CYCLEN,TRINESSA) 0.18/0.215/0.25 MG-35 MCG per tablet; Take 1 tablet by mouth daily    Screen for STD (sexually transmitted disease)         Pap smear deferred due to low risk status.  Encouraged self breast examination as well as calcium supplementation.  Continue Ortho Tri-Cyclen x 1 year.  Cultures obtained for GC, chlamydia, trichomonas.  Reviewed safe sex practices.  Return to office in 1 year or as needed    History of Present Illness   HPI  Floresita Ladd is a 29 y.o. female who presents       This is a pleasant 29-year-old female G0 who presents for her GYN exam.  She offers no complaints today.  Her menstrual cycles are regular every 28 days lasting 4 days with no breakthrough bleeding.  She denies any changes in bowel or bladder function.  She is sexually active, monogamous relationship for less than 1 year.  She does not use condoms.  She is doing well on her birth control pills.  She did receive the Gardasil vaccine in .  Pap smears have been normal.  Last Pap .        History obtained from: patient    Review of Systems   Constitutional:  Negative for fatigue, fever and unexpected weight change.   Respiratory:  Negative for cough, chest tightness, shortness of breath and wheezing.    Cardiovascular: Negative.  Negative for chest pain and palpitations.   Gastrointestinal: Negative.  Negative for abdominal distention, abdominal pain, blood in stool, constipation, diarrhea, nausea and vomiting.   Genitourinary: Negative.  Negative for difficulty urinating, dyspareunia, dysuria, flank pain, frequency, genital sores, hematuria,  "pelvic pain, urgency, vaginal bleeding, vaginal discharge and vaginal pain.   Skin:  Negative for rash.     Current Outpatient Medications on File Prior to Visit   Medication Sig Dispense Refill    albuterol (PROVENTIL HFA,VENTOLIN HFA) 90 mcg/act inhaler Inhale 2 puffs every 6 (six) hours      famotidine (PEPCID) 20 mg tablet TAKE 1 TABLET BY MOUTH 2 TIMES A DAY AS NEEDED FOR HEARTBURN.      [DISCONTINUED] norgestimate-ethinyl estradiol (ORTHO TRI-CYCLEN,TRINESSA) 0.18/0.215/0.25 MG-35 MCG per tablet Take 1 tablet by mouth daily 84 tablet 1     No current facility-administered medications on file prior to visit.         Objective   /68   Ht 5' 1\" (1.549 m)   Wt 59.1 kg (130 lb 6.4 oz)   LMP 01/30/2025 (Approximate)   BMI 24.64 kg/m²      Physical Exam  Constitutional:       Appearance: Normal appearance. She is well-developed.   HENT:      Head: Normocephalic and atraumatic.   Cardiovascular:      Rate and Rhythm: Normal rate and regular rhythm.   Pulmonary:      Effort: Pulmonary effort is normal.      Breath sounds: Normal breath sounds.   Chest:   Breasts:     Right: No inverted nipple, mass, nipple discharge, skin change or tenderness.      Left: No inverted nipple, mass, nipple discharge, skin change or tenderness.   Abdominal:      General: Bowel sounds are normal. There is no distension.      Palpations: Abdomen is soft.      Tenderness: There is no abdominal tenderness. There is no guarding or rebound.   Genitourinary:     Labia:         Right: No rash, tenderness or lesion.         Left: No rash, tenderness or lesion.       Vagina: Normal. No signs of injury. No vaginal discharge or tenderness.      Cervix: No cervical motion tenderness, discharge, friability, lesion or cervical bleeding.      Uterus: Not enlarged and not tender.       Adnexa:         Right: No mass, tenderness or fullness.          Left: No mass, tenderness or fullness.     Neurological:      Mental Status: She is alert. "   Psychiatric:         Behavior: Behavior normal.         Administrative Statements   I have spent a total time of 20 minutes in caring for this patient on the day of the visit/encounter including Impressions, Counseling / Coordination of care, Documenting in the medical record, Reviewing/placing orders in the medical record (including tests, medications, and/or procedures), and Obtaining or reviewing history  .

## 2025-02-19 LAB
C TRACH DNA SPEC QL NAA+PROBE: NEGATIVE
M GENITALIUM DNA SPEC QL NAA+PROBE: NEGATIVE
N GONORRHOEA DNA SPEC QL NAA+PROBE: NEGATIVE
T VAGINALIS DNA SPEC QL NAA+PROBE: NEGATIVE